# Patient Record
Sex: MALE | Race: WHITE | NOT HISPANIC OR LATINO
[De-identification: names, ages, dates, MRNs, and addresses within clinical notes are randomized per-mention and may not be internally consistent; named-entity substitution may affect disease eponyms.]

---

## 2024-09-16 PROBLEM — Z00.00 ENCOUNTER FOR PREVENTIVE HEALTH EXAMINATION: Status: ACTIVE | Noted: 2024-09-16

## 2024-09-17 ENCOUNTER — APPOINTMENT (OUTPATIENT)
Dept: SURGERY | Facility: CLINIC | Age: 64
End: 2024-09-17

## 2024-09-30 ENCOUNTER — APPOINTMENT (OUTPATIENT)
Dept: SURGERY | Facility: CLINIC | Age: 64
End: 2024-09-30
Payer: COMMERCIAL

## 2024-09-30 VITALS
DIASTOLIC BLOOD PRESSURE: 68 MMHG | HEIGHT: 74 IN | TEMPERATURE: 97 F | SYSTOLIC BLOOD PRESSURE: 110 MMHG | HEART RATE: 48 BPM | OXYGEN SATURATION: 98 % | WEIGHT: 155 LBS | BODY MASS INDEX: 19.89 KG/M2

## 2024-09-30 DIAGNOSIS — Z78.9 OTHER SPECIFIED HEALTH STATUS: ICD-10-CM

## 2024-09-30 PROCEDURE — 99202 OFFICE O/P NEW SF 15 MIN: CPT

## 2024-10-06 PROBLEM — Z78.9 NON-SMOKER: Status: ACTIVE | Noted: 2024-09-30

## 2024-10-06 NOTE — ASSESSMENT
[FreeTextEntry1] : Benign-appearing subcutaneous soft tissue mass overlying the left hip, consistent with a benign lipoma.  No imaging studies or further workup are thought to be necessary.  This can be excised under local anesthesia with IV sedation, the procedure was explained in full, and an appropriate surgical consent was obtained.  He will contact the office for scheduling.  All his questions were answered and he is happy with the assessment and plan.

## 2024-10-06 NOTE — HISTORY OF PRESENT ILLNESS
[de-identified] : The patient presents for management of a lipoma on his left hip.  He first noticed this about 2 months ago and believes it is enlarging slowly.  It is asymptomatic but is located adjacent to where he had an ORIF of a pelvic fracture, after a fall in the year 2000.

## 2024-10-06 NOTE — PHYSICAL EXAM
[Normal Thyroid] : the thyroid was normal [Normal Breath Sounds] : Normal breath sounds [Normal Heart Sounds] : normal heart sounds [Normal Rate and Rhythm] : normal rate and rhythm [No HSM] : no hepatosplenomegaly [Abdominal Masses] : No abdominal masses [Abdomen Tenderness] : ~T ~M No abdominal tenderness [de-identified] : Thin and healthy [de-identified] : No adenopathy [de-identified] : No palpable masses or suspicious areas in either breast region.  No axillary adenopathy [de-identified] : 4 x 5 cm fleshy subcutaneous mass overlying the left anterior superior iliac spine, mobile and nontender, no overlying skin changes.  No inguinal or femoral hernias or adenopathy are noted.

## 2024-10-08 ENCOUNTER — NON-APPOINTMENT (OUTPATIENT)
Age: 64
End: 2024-10-08

## 2024-10-09 ENCOUNTER — APPOINTMENT (OUTPATIENT)
Dept: ORTHOPEDIC SURGERY | Facility: CLINIC | Age: 64
End: 2024-10-09

## 2024-10-09 VITALS — WEIGHT: 155 LBS | HEIGHT: 74 IN | BODY MASS INDEX: 19.89 KG/M2

## 2024-10-09 DIAGNOSIS — Z78.9 OTHER SPECIFIED HEALTH STATUS: ICD-10-CM

## 2024-10-09 DIAGNOSIS — D21.5: ICD-10-CM

## 2024-10-09 PROCEDURE — 99203 OFFICE O/P NEW LOW 30 MIN: CPT

## 2024-10-15 ENCOUNTER — RESULT REVIEW (OUTPATIENT)
Age: 64
End: 2024-10-15

## 2024-10-15 ENCOUNTER — OUTPATIENT (OUTPATIENT)
Dept: OUTPATIENT SERVICES | Facility: HOSPITAL | Age: 64
LOS: 1 days | End: 2024-10-15
Payer: COMMERCIAL

## 2024-10-15 DIAGNOSIS — D21.5 BENIGN NEOPLASM OF CONNECTIVE AND OTHER SOFT TISSUE OF PELVIS: ICD-10-CM

## 2024-10-15 DIAGNOSIS — Z00.8 ENCOUNTER FOR OTHER GENERAL EXAMINATION: ICD-10-CM

## 2024-10-15 PROCEDURE — 73721 MRI JNT OF LWR EXTRE W/O DYE: CPT | Mod: LT

## 2024-10-15 PROCEDURE — 73721 MRI JNT OF LWR EXTRE W/O DYE: CPT | Mod: 26,LT

## 2024-10-16 DIAGNOSIS — D21.5 BENIGN NEOPLASM OF CONNECTIVE AND OTHER SOFT TISSUE OF PELVIS: ICD-10-CM

## 2024-10-29 ENCOUNTER — APPOINTMENT (OUTPATIENT)
Dept: SURGERY | Facility: AMBULATORY SURGERY CENTER | Age: 64
End: 2024-10-29

## 2024-11-11 ENCOUNTER — APPOINTMENT (OUTPATIENT)
Dept: SURGERY | Facility: CLINIC | Age: 64
End: 2024-11-11

## 2024-11-15 ENCOUNTER — OUTPATIENT (OUTPATIENT)
Dept: OUTPATIENT SERVICES | Facility: HOSPITAL | Age: 64
LOS: 1 days | End: 2024-11-15
Payer: COMMERCIAL

## 2024-11-15 ENCOUNTER — RESULT REVIEW (OUTPATIENT)
Age: 64
End: 2024-11-15

## 2024-11-15 DIAGNOSIS — D21.5 BENIGN NEOPLASM OF CONNECTIVE AND OTHER SOFT TISSUE OF PELVIS: ICD-10-CM

## 2024-11-15 DIAGNOSIS — Z00.8 ENCOUNTER FOR OTHER GENERAL EXAMINATION: ICD-10-CM

## 2024-11-15 PROCEDURE — 73700 CT LOWER EXTREMITY W/O DYE: CPT | Mod: 26,LT

## 2024-11-15 PROCEDURE — 73700 CT LOWER EXTREMITY W/O DYE: CPT | Mod: LT

## 2024-11-16 DIAGNOSIS — D21.5 BENIGN NEOPLASM OF CONNECTIVE AND OTHER SOFT TISSUE OF PELVIS: ICD-10-CM

## 2024-11-25 ENCOUNTER — APPOINTMENT (OUTPATIENT)
Dept: ORTHOPEDIC SURGERY | Facility: CLINIC | Age: 64
End: 2024-11-25
Payer: COMMERCIAL

## 2024-11-25 DIAGNOSIS — D21.5: ICD-10-CM

## 2024-11-25 PROCEDURE — 99213 OFFICE O/P EST LOW 20 MIN: CPT | Mod: 95
